# Patient Record
Sex: FEMALE | Race: BLACK OR AFRICAN AMERICAN | URBAN - METROPOLITAN AREA
[De-identification: names, ages, dates, MRNs, and addresses within clinical notes are randomized per-mention and may not be internally consistent; named-entity substitution may affect disease eponyms.]

---

## 2019-11-13 ENCOUNTER — HOSPITAL ENCOUNTER (OUTPATIENT)
Dept: PHYSICAL THERAPY | Age: 69
Discharge: HOME | End: 2019-11-13
Payer: MEDICARE

## 2019-11-13 PROCEDURE — 97162 PT EVAL MOD COMPLEX 30 MIN: CPT

## 2019-11-13 NOTE — PROGRESS NOTES
PHYSICAL THERAPY - DAILY TREATMENT NOTE    Patient Name: Jordi Hunt        Date: 2019  : 1950   Yes Patient  Verified  Visit #:     Insurance: Payor: Kim Kendall / Plan: [facility] / Product Type: Managed Care Medicare /      In time: 2:22 Out time: 2:53   Total Treatment Time: 31     Medicare/[facility] Time Tracking (below)   Total Timed Codes (min):  00 1:1 Treatment Time:  00     TREATMENT AREA =  Low back pain [M54.5]  Neck pain [M54.2]    SUBJECTIVE  Pain Level (on 0 to 10 scale):  7-8  / 10   Medication Changes/New allergies or changes in medical history, any new surgeries or procedures?    no  If yes, update Summary List   Subjective Functional Status/Changes:  []  No changes reported     See POC         OBJECTIVE  Modalities Rationale: Therex/HEP held secondary to late start of eval (prolonged time required for patient to complete paper work) and decreased tolerance for participation in evaluation due to high pain level    Billed With/As:   [x] TE   [] TA   [] Neuro   [] Self Care Patient Education: [x] Review HEP    [x] Progressed/Changed HEP based on:   [x] positioning   [x] body mechanics   [] transfers   [] heat/ice application    [] other:      Other Objective/Functional Measures:    See POC     Post Treatment Pain Level (on 0 to 10) scale:   8   10     ASSESSMENT  Assessment/Changes in Function:     See POC     []  See Progress Note/Recertification   Patient will continue to benefit from skilled PT services to modify and progress therapeutic interventions, address functional mobility deficits, address ROM deficits, address strength deficits, analyze and address soft tissue restrictions, analyze and cue movement patterns, analyze and modify body mechanics/ergonomics, assess and modify postural abnormalities and instruct in home and community integration to attain remaining goals.    Progress toward goals / Updated goals:    See newly established goals in [address] [facility]404 [address]  [x]  Upgrade activities as tolerated yes Continue plan of care   []  Discharge due to :    []  Other:      Therapist: Michelle Key    Date: 11/13/2019 Time: 12:26 PM     Future Appointments   Date Time Provider Denton Osorio   11/13/2019  2:00 PM Marisela Herrera
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
## 2019-11-18 ENCOUNTER — HOSPITAL ENCOUNTER (OUTPATIENT)
Dept: PHYSICAL THERAPY | Age: 69
Discharge: HOME | End: 2019-11-18
Payer: MEDICARE

## 2019-11-18 PROCEDURE — 97110 THERAPEUTIC EXERCISES: CPT

## 2019-11-18 PROCEDURE — 97140 MANUAL THERAPY 1/> REGIONS: CPT

## 2019-11-18 NOTE — PROGRESS NOTES
PHYSICAL THERAPY - DAILY TREATMENT NOTE    Patient Name: Chino Ulrich        Date: 2019  : 1950   yes Patient  Verified  Visit #:     Insurance: Payor: Sophy Hearing / Plan: [address] [place] [address] HMO / Product Type: Managed Care Medicare /      In time: 330 Out time: 410   Total Treatment Time: 40     Medicare/[employer] Time Tracking (below)   Total Timed Codes (min):  25 1:1 Treatment Time:  25     TREATMENT AREA =  Low back pain [M54.5]  Neck pain [M54.2]    SUBJECTIVE  Pain Level (on 0 to 10 scale):  7  / 10   Medication Changes/New allergies or changes in medical history, any new surgeries or procedures?    no  If yes, update Summary List   Subjective Functional Status/Changes:  []  No changes reported     Pt states she took a muscle relaxer before coming in and is having a really hard time staying awake, doing any kind of exercise. OBJECTIVE  Modalities Rationale:     decrease pain and increase tissue extensibility to improve patient's ability to perform pain free ADLs.     min [] Estim, type/location:                                      []  att     []  unatt     []  w/US     []  w/ice    []  w/heat    min []  Mechanical Traction: type/lbs                   []  pro   []  sup   []  int   []  cont    []  before manual    []  after manual    min []  Ultrasound, settings/location:      min []  Iontophoresis w/ dexamethasone, location:                                               []  take home patch       []  in clinic   15 min []  Ice     [x]  Heat    location/position: Supine, cervical.     min []  Vasopneumatic Device, press/temp:     min []  Other:    [x] Skin assessment post-treatment (if applicable):    [x]  intact    []  redness- no adverse reaction     []redness  adverse reaction:        10 min Therapeutic Exercise:  [x]  See flow sheet   Rationale:      increase ROM, increase strength and improve coordination to improve the patients ability to perform pain free ADLs. 15 Min Manual Therapy: STM/DTM (B) c/s paraspinals, UT/LS.  SOR. Rationale:      decrease pain, increase ROM, increase tissue extensibility and decrease trigger points to improve patient's ability to perform pain free ADLs. Billed With/As:   [x] TE   [] TA   [] Neuro   [] Self Care Patient Education: [x] Review HEP    [] Progressed/Changed HEP based on:   [] positioning   [] body mechanics   [] transfers   [] heat/ice application    [] other:      Other Objective/Functional Measures: Therex per flow sheet. Updated HEP exercises, instructed pt to perform 2x daily. Post Treatment Pain Level (on 0 to 10) scale:   7  / 10     ASSESSMENT  Assessment/Changes in Function:     Unable to initiate multiple exercises due to pt having poor motor control following the use of a muscle relaxer before session. []  See Progress Note/Recertification   Patient will continue to benefit from skilled PT services to modify and progress therapeutic interventions, address functional mobility deficits, address ROM deficits, address strength deficits, analyze and address soft tissue restrictions, analyze and cue movement patterns, analyze and modify body mechanics/ergonomics and assess and modify postural abnormalities to attain remaining goals. Progress toward goals / Updated goals:    Initiated follow-ups.        PLAN  [x]  Upgrade activities as tolerated yes Continue plan of care   []  Discharge due to :    []  Other:      Therapist: Chon Burns PTA    Date: 11/18/2019 Time: 4:06 PM     Future Appointments   Date Time Provider Denton Osorio   11/20/2019  3:30 PM Rani Wu [facility]   11/25/2019  3:00 PM Rani Wu [facility]   11/27/2019  3:30 PM Jannie Suero [facility]   12/2/2019  3:00 PM Manjit Lockhart PTA [facility]   12/4/2019  3:00 PM Manjit Lockhart PTA [facility]   12/9/2019  3:00 PM Rani Wu [facility]   12/11/2019  3:00 PM Manjit Lockhart PTA [facility] 12/16/2019  3:00 PM Yue Barrera [facility]   12/18/2019  3:00 PM Yue Barrera [facility]   12/23/2019  3:00 PM Carolann Hart [facility]

## 2019-11-20 ENCOUNTER — HOSPITAL ENCOUNTER (OUTPATIENT)
Dept: PHYSICAL THERAPY | Age: 69
Discharge: HOME | End: 2019-11-20
Payer: MEDICARE

## 2019-11-20 PROCEDURE — 97110 THERAPEUTIC EXERCISES: CPT

## 2019-11-20 PROCEDURE — 97140 MANUAL THERAPY 1/> REGIONS: CPT

## 2019-11-20 NOTE — PROGRESS NOTES
PHYSICAL THERAPY - DAILY TREATMENT NOTE    Patient Name: Gregoria Haider        Date: 2019  : 1950   yes Patient  Verified  Visit #:   3   of   12  Insurance: Payor: Yaron Siddiqi / Plan: [address] [place] [address] HMO / Product Type: Managed Care Medicare /      In time: 335 Out time: 440   Total Treatment Time: 65     Medicare/[employer] Time Tracking (below)   Total Timed Codes (min):  55 1:1 Treatment Time:  55     TREATMENT AREA =  Low back pain [M54.5]  Neck pain [M54.2]    SUBJECTIVE  Pain Level (on 0 to 10 scale):  7  / 10   Medication Changes/New allergies or changes in medical history, any new surgeries or procedures?    no  If yes, update Summary List   Subjective Functional Status/Changes:  []  No changes reported     I need to take my Tylenol and Flexeril. OBJECTIVE  Modalities Rationale:     decrease pain and increase tissue extensibility to improve patient's ability to perform pain free ADLs. min [] Estim, type/location:                                      []  att     []  unatt     []  w/US     []  w/ice    []  w/heat    min []  Mechanical Traction: type/lbs                   []  pro   []  sup   []  int   []  cont    []  before manual    []  after manual    min []  Ultrasound, settings/location:      min []  Iontophoresis w/ dexamethasone, location:                                               []  take home patch       []  in clinic   10 min []  Ice     [x]  Heat    location/position: Supine, cervical and lumbar. min []  Vasopneumatic Device, press/temp:     min []  Other:    [x] Skin assessment post-treatment (if applicable):    [x]  intact    []  redness- no adverse reaction     []redness  adverse reaction:        40 min Therapeutic Exercise:  [x]  See flow sheet   Rationale:      increase ROM, increase strength and improve coordination to improve the patients ability to perform pain free ADLs. 15 Min Manual Therapy: STM/DTM (B) c/s paraspinals, UT/LS.   SOR Rationale:      decrease pain, increase ROM, increase tissue extensibility and decrease trigger points to improve patient's ability to perform pain free ADLs. Billed With/As:   [x] TE   [] TA   [] Neuro   [] Self Care Patient Education: [x] Review HEP    [] Progressed/Changed HEP based on:   [] positioning   [] body mechanics   [] transfers   [] heat/ice application    [] other:      Other Objective/Functional Measures: Added multiple exercises to improve core, upper trunk, and cervical strength/mobility for ADLs. See flow sheet. Post Treatment Pain Level (on 0 to 10) scale:   7  / 10     ASSESSMENT  Assessment/Changes in Function:     VC's for abdominal draw. Pt hesitant to perform therex, but able to complete with encouragement. []  See Progress Note/Recertification   Patient will continue to benefit from skilled PT services to modify and progress therapeutic interventions, address functional mobility deficits, address ROM deficits, address strength deficits, analyze and address soft tissue restrictions, analyze and cue movement patterns, analyze and modify body mechanics/ergonomics and assess and modify postural abnormalities to attain remaining goals. Progress toward goals / Updated goals:    Good progress toward LTG's 1, 2, and 4 with good pt participation during therex.       PLAN  [x]  Upgrade activities as tolerated yes Continue plan of care   []  Discharge due to :    []  Other:      Therapist: Tahira Kenny PTA    Date: 11/20/2019 Time: 3:39 PM     Future Appointments   Date Time Provider Denton Osorio   11/25/2019  3:00 PM [facility]   11/27/2019  3:30 PM Dexter Suero [facility]   12/2/2019  3:00 PM Ewa Reece PTA [facility]   12/4/2019  3:00 PM Ewa Reece PTA [facility]   12/9/2019  3:00 PM Ewa Reece PTA [facility]   12/11/2019  3:00 PM [facility]   12/16/2019  3:00 PM [facility]   12/18/2019  3:00 PM Dawit Baltazar [facility]   12/23/2019  3:00 PM Brian Rankin [facility]

## 2019-11-25 ENCOUNTER — HOSPITAL ENCOUNTER (OUTPATIENT)
Dept: PHYSICAL THERAPY | Age: 69
Discharge: HOME | End: 2019-11-25
Payer: MEDICARE

## 2019-11-25 PROCEDURE — 97110 THERAPEUTIC EXERCISES: CPT

## 2019-11-25 PROCEDURE — 97140 MANUAL THERAPY 1/> REGIONS: CPT

## 2019-11-25 NOTE — PROGRESS NOTES
PHYSICAL THERAPY - DAILY TREATMENT NOTE    Patient Name: Erasmo Leo        Date: 2019  : 1950   yes Patient  Verified  Visit #:     Insurance: Payor: Alisa Konelly / Plan: [address] [place] [address] HMO / Product Type: Managed Care Medicare /      In time: 305 Out time: 410   Total Treatment Time: 65     Medicare/[employer] Time Tracking (below)   Total Timed Codes (min):  55 1:1 Treatment Time:  40     TREATMENT AREA =  Low back pain [M54.5]  Neck pain [M54.2]    SUBJECTIVE  Pain Level (on 0 to 10 scale):  7  / 10   Medication Changes/New allergies or changes in medical history, any new surgeries or procedures?    no  If yes, update Summary List   Subjective Functional Status/Changes:  []  No changes reported     No new complaints. OBJECTIVE  Modalities Rationale:     decrease pain and increase tissue extensibility to improve patient's ability to perform pain free ADLs. min [] Estim, type/location:                                      []  att     []  unatt     []  w/US     []  w/ice    []  w/heat    min []  Mechanical Traction: type/lbs                   []  pro   []  sup   []  int   []  cont    []  before manual    []  after manual    min []  Ultrasound, settings/location:      min []  Iontophoresis w/ dexamethasone, location:                                               []  take home patch       []  in clinic   10 min []  Ice     [x]  Heat    location/position: Supine, cervical.     min []  Vasopneumatic Device, press/temp:     min []  Other:    [x] Skin assessment post-treatment (if applicable):    [x]  intact    []  redness- no adverse reaction     []redness  adverse reaction:        40 (25) min Therapeutic Exercise:  [x]  See flow sheet   Rationale:      increase ROM, increase strength and improve coordination to improve the patients ability to perform pain free ADLs. 15 Min Manual Therapy: STM/DTM (R) c/s paraspinals.     Rationale:      decrease pain, increase ROM, increase tissue extensibility and decrease trigger points to improve patient's ability to perform pain free ADLs. Billed With/As:   [x] TE   [] TA   [] Neuro   [] Self Care Patient Education: [x] Review HEP    [] Progressed/Changed HEP based on:   [] positioning   [] body mechanics   [] transfers   [] heat/ice application    [] other:      Other Objective/Functional Measures: Therex per flow sheet. Post Treatment Pain Level (on 0 to 10) scale:   7  / 10     ASSESSMENT  Assessment/Changes in Function:     No exacerbation of symptoms with today's session. []  See Progress Note/Recertification   Patient will continue to benefit from skilled PT services to modify and progress therapeutic interventions, address functional mobility deficits, address ROM deficits, address strength deficits, analyze and address soft tissue restrictions, analyze and cue movement patterns, analyze and modify body mechanics/ergonomics and assess and modify postural abnormalities to attain remaining goals. Progress toward goals / Updated goals:    Slow progress toward pain goals.       PLAN  [x]  Upgrade activities as tolerated yes Continue plan of care   []  Discharge due to :    []  Other:      Therapist: Cohn Burns PTA    Date: 11/25/2019 Time: 3:12 PM     Future Appointments   Date Time Provider Denton Osorio   11/27/2019  3:30 PM Jannie Suero [facility]   12/2/2019  3:00 PM Manjit Lockhart, PTA [facility]   12/4/2019  3:00 PM Geralene Balder, PTA [facility]   12/9/2019  3:00 PM Gerkyra Balvincent, PTA [facility]   12/11/2019  3:00 PM Geralene Balvincent, PTA [facility]   12/16/2019  3:00 PM Rani Wu [facility]   12/18/2019  3:00 PM Rani Wu [facility]   12/23/2019  3:00 PM Mirta Cueva [facility]

## 2019-11-27 ENCOUNTER — HOSPITAL ENCOUNTER (OUTPATIENT)
Dept: PHYSICAL THERAPY | Age: 69
Discharge: HOME | End: 2019-11-27
Payer: MEDICARE

## 2019-11-27 PROCEDURE — 97140 MANUAL THERAPY 1/> REGIONS: CPT

## 2019-11-27 PROCEDURE — 97110 THERAPEUTIC EXERCISES: CPT

## 2019-11-27 NOTE — PROGRESS NOTES
PHYSICAL THERAPY - DAILY TREATMENT NOTE    Patient Name: Andrea Martel        Date: 2019  : 1950   yes Patient  Verified  Visit #:     Insurance: Payor: Chelsey Burleson / Plan: [address] [place] [address] HMO / Product Type: Managed Care Medicare /      In time: 3:37 PM Out time: 4:40 PM   Total Treatment Time: 63     Medicare/BCBS Time Tracking (below)   Total Timed Codes (min):  53 1:1 Treatment Time:  40     TREATMENT AREA =  Low back pain [M54.5]  Neck pain [M54.2]    SUBJECTIVE  Pain Level (on 0 to 10 scale):  6-7 / 10   Medication Changes/New allergies or changes in medical history, any new surgeries or procedures?    no  If yes, update Summary List   Subjective Functional Status/Changes:  []  No changes reported     Patient reports having more pain in her low back than neck. Neck and shoulders have been feeling better since beginning PT       OBJECTIVE  Modalities Rationale:     decrease pain and increase tissue extensibility to improve patient's ability to perform pain free ADLs.     min [] Estim, type/location:                                      []  att     []  unatt     []  w/US     []  w/ice    []  w/heat    min []  Mechanical Traction: type/lbs                   []  pro   []  sup   []  int   []  cont    []  before manual    []  after manual    min []  Ultrasound, settings/location:      min []  Iontophoresis w/ dexamethasone, location:                                               []  take home patch       []  in clinic   10 min []  Ice     [x]  Heat    location/position: To L/S in supine with wedge post-session    min []  Vasopneumatic Device, press/temp:     min []  Other:    [x] Skin assessment post-treatment (if applicable):    [x]  intact    []  redness- no adverse reaction     []redness  adverse reaction:        28/41 min Therapeutic Exercise:  [x]  See flow sheet   Rationale:      increase ROM, increase strength and improve coordination to improve the patients ability to perform pain free ADLs. 12 Min Manual Therapy: STM/DTM thoracolumbar junction and R>L L/S paraspinals in prone with 1 pillow under hips    Rationale:      decrease pain, increase ROM, increase tissue extensibility and decrease trigger points to improve patient's ability to perform pain free ADLs. Billed With/As:   [x] TE   [] TA   [] Neuro   [] Self Care Patient Education: [x] Review HEP    [] Progressed/Changed HEP based on:   [] positioning   [] body mechanics   [] transfers   [] heat/ice application    [] other:      Other Objective/Functional Measures:    1:1 TE = 28'    Difficulty with mat/table transfers. Prone<>sidelying<>sit, req'd CGA. Ease with supine <>sidelying <> sit   Added SB rollout flexion and progressed to bridges with glute set to improve spinal stability. Post Treatment Pain Level (on 0 to 10) scale:   0  / 10     ASSESSMENT  Assessment/Changes in Function:     Challenged with all therex likely due to weakness and poor endurance, however patient able to perform all therex with good form following vc's and encouragement. []  See Progress Note/Recertification   Patient will continue to benefit from skilled PT services to modify and progress therapeutic interventions, address functional mobility deficits, address ROM deficits, address strength deficits, analyze and address soft tissue restrictions, analyze and cue movement patterns, analyze and modify body mechanics/ergonomics and assess and modify postural abnormalities to attain remaining goals.    Progress toward goals / Updated goals:    Progressing towards LTG #2     PLAN  [x]  Upgrade activities as tolerated yes Continue plan of care   []  Discharge due to :    []  Other:      Therapist: Ebony MOLINA    Date: 11/27/2019 Time: 4:57 PM     Future Appointments   Date Time Provider Denton Osorio   11/27/2019  3:30 PM Martha Suero [facility]   12/2/2019  3:00 PM Michelle Short PTA [facility]   12/4/2019 3:00 PM Obie Saver [facility]   12/9/2019  3:00 PM Obie Saver [facility]   12/11/2019  3:00 PM Pierce Saver [facility]   12/16/2019  3:00 PM Pierce Saver [facility]   12/18/2019  3:00 PM Pierce Saver [facility]   12/23/2019  3:00 PM Abilio Clements [facility]

## 2019-12-02 ENCOUNTER — HOSPITAL ENCOUNTER (OUTPATIENT)
Dept: PHYSICAL THERAPY | Age: 69
Discharge: HOME | End: 2019-12-02
Payer: MEDICARE

## 2019-12-02 PROCEDURE — 97110 THERAPEUTIC EXERCISES: CPT

## 2019-12-02 PROCEDURE — 97140 MANUAL THERAPY 1/> REGIONS: CPT

## 2019-12-02 NOTE — PROGRESS NOTES
PHYSICAL THERAPY - DAILY TREATMENT NOTE    Patient Name: Rosemary Cloud        Date: 2019  : 1950   yes Patient  Verified  Visit #:     Insurance: Payor: Robert Vick / Plan: [address] [place] [address] HMO / Product Type: Managed Care Medicare /      In time: 330 Out time: 450   Total Treatment Time: 80     Medicare/BCBS Time Tracking (below)   Total Timed Codes (min):  70 1:1 Treatment Time:  40     TREATMENT AREA =  Low back pain [M54.5]  Neck pain [M54.2]    SUBJECTIVE  Pain Level (on 0 to 10 scale):  6  / 10   Medication Changes/New allergies or changes in medical history, any new surgeries or procedures?    no  If yes, update Summary List   Subjective Functional Status/Changes:  []  No changes reported     Pt reports pain in the (R) neck/shoulder, low back. Says her knee is bothering her today as well from using the steps. OBJECTIVE  Modalities Rationale:     decrease pain and increase tissue extensibility to improve patient's ability to perform pain free ADLs. min [] Estim, type/location:                                      []  att     []  unatt     []  w/US     []  w/ice    []  w/heat    min []  Mechanical Traction: type/lbs                   []  pro   []  sup   []  int   []  cont    []  before manual    []  after manual    min []  Ultrasound, settings/location:      min []  Iontophoresis w/ dexamethasone, location:                                               []  take home patch       []  in clinic   10 min []  Ice     [x]  Heat    location/position: Supine, lumbar. min []  Vasopneumatic Device, press/temp:     min []  Other:    [x] Skin assessment post-treatment (if applicable):    [x]  intact    []  redness- no adverse reaction     []redness  adverse reaction:        45 (15) min Therapeutic Exercise:  [x]  See flow sheet   Rationale:      increase ROM, increase strength and improve coordination to improve the patients ability to perform pain free ADLs. 25 min Manual Therapy: STM/DTM (B) c/s paraspinals, SOR. Rationale:      decrease pain, increase ROM, increase tissue extensibility and decrease trigger points to improve patient's ability to perform pain free ADLs. Billed With/As:   [x] TE   [] TA   [] Neuro   [] Self Care Patient Education: [x] Review HEP    [] Progressed/Changed HEP based on:   [] positioning   [] body mechanics   [] transfers   [] heat/ice application    [] other:      Other Objective/Functional Measures: Added Deadbugs, rows/ext w/ t-band to improve trunk/core strength for ADLs. Post Treatment Pain Level (on 0 to 10) scale:   3-4  / 10     ASSESSMENT  Assessment/Changes in Function:     No exacerbation of symptoms with today's session. []  See Progress Note/Recertification   Patient will continue to benefit from skilled PT services to modify and progress therapeutic interventions, address functional mobility deficits, address ROM deficits, address strength deficits, analyze and address soft tissue restrictions, analyze and cue movement patterns, analyze and modify body mechanics/ergonomics and assess and modify postural abnormalities to attain remaining goals. Progress toward goals / Updated goals:    No change in progress toward LTG's with today's session.       PLAN  [x]  Upgrade activities as tolerated yes Continue plan of care   []  Discharge due to :    []  Other:      Therapist: Alfa Parr PTA    Date: 12/2/2019 Time: 4:02 PM     Future Appointments   Date Time Provider Denton Osorio   12/4/2019  3:00 PM [facility]   12/9/2019  3:00 PM [facility]   12/11/2019  3:00 PM Nahomy Rasmussen PTA [facility]   12/16/2019  3:00 PM [facility]   12/18/2019  3:00 PM [facility]   12/23/2019  3:00 PM Meme Kong [facility]

## 2019-12-04 ENCOUNTER — HOSPITAL ENCOUNTER (OUTPATIENT)
Dept: PHYSICAL THERAPY | Age: 69
Discharge: HOME | End: 2019-12-04
Payer: MEDICARE

## 2019-12-04 PROCEDURE — 97140 MANUAL THERAPY 1/> REGIONS: CPT

## 2019-12-04 PROCEDURE — 97110 THERAPEUTIC EXERCISES: CPT

## 2019-12-04 NOTE — PROGRESS NOTES
PHYSICAL THERAPY - DAILY TREATMENT NOTE    Patient Name: Gregoria Haider        Date: 2019  : 1950   yes Patient  Verified  Visit #:     Insurance: Payor: Yaron Siddiqi / Plan: [address] [place] [address] HMO / Product Type: Managed Care Medicare /      In time: 3 Out time: 4   Total Treatment Time: 60     Medicare/BCBS Time Tracking (below)   Total Timed Codes (min):  50 1:1 Treatment Time:  50     TREATMENT AREA =  Low back pain [M54.5]  Neck pain [M54.2]    SUBJECTIVE  Pain Level (on 0 to 10 scale):  6  / 10   Medication Changes/New allergies or changes in medical history, any new surgeries or procedures?    no  If yes, update Summary List   Subjective Functional Status/Changes:  []  No changes reported     Pt reporting pain in the neck today. States it's really more uncomfortable than it is painful. OBJECTIVE  Modalities Rationale:     decrease pain and increase tissue extensibility to improve patient's ability to perform pain free ADLs. min [] Estim, type/location:                                      []  att     []  unatt     []  w/US     []  w/ice    []  w/heat    min []  Mechanical Traction: type/lbs                   []  pro   []  sup   []  int   []  cont    []  before manual    []  after manual    min []  Ultrasound, settings/location:      min []  Iontophoresis w/ dexamethasone, location:                                               []  take home patch       []  in clinic   10 min []  Ice     [x]  Heat    location/position: Supine, lumbar. min []  Vasopneumatic Device, press/temp:     min []  Other:    [x] Skin assessment post-treatment (if applicable):    [x]  intact    []  redness- no adverse reaction     []redness  adverse reaction:        35 min Therapeutic Exercise:  [x]  See flow sheet   Rationale:      increase ROM, increase strength and improve coordination to improve the patients ability to perform pain free ADLs.       15 Min Manual Therapy: STM/DTM (B) c/s paraspinals, SOR. Rationale:      decrease pain, increase ROM, increase tissue extensibility and decrease trigger points to improve patient's ability to perform pain free ADLs. Billed With/As:   [x] TE   [] TA   [] Neuro   [] Self Care Patient Education: [x] Review HEP    [] Progressed/Changed HEP based on:   [] positioning   [] body mechanics   [] transfers   [] heat/ice application    [] other:      Other Objective/Functional Measures:    Able to perform full ROM bridge with some difficulty. Post Treatment Pain Level (on 0 to 10) scale:   4  / 10     ASSESSMENT  Assessment/Changes in Function:     No exacerbation of symptoms with today's session. []  See Progress Note/Recertification   Patient will continue to benefit from skilled PT services to modify and progress therapeutic interventions, address functional mobility deficits, address ROM deficits, address strength deficits, analyze and address soft tissue restrictions, analyze and cue movement patterns, analyze and modify body mechanics/ergonomics and assess and modify postural abnormalities to attain remaining goals. Progress toward goals / Updated goals:    LTG #4.       PLAN  [x]  Upgrade activities as tolerated yes Continue plan of care   []  Discharge due to :    []  Other:      Therapist: Seda Mayfield PTA    Date: 12/4/2019 Time: 3:07 PM     Future Appointments   Date Time Provider Denton Osorio   12/9/2019  3:00 PM [facility]   12/11/2019  3:00 PM [facility]   12/16/2019  3:00 PM [facility]   12/18/2019  3:00 PM [facility]   12/23/2019  3:00 PM Danny Monterroso [facility]

## 2019-12-09 ENCOUNTER — HOSPITAL ENCOUNTER (OUTPATIENT)
Dept: PHYSICAL THERAPY | Age: 69
Discharge: HOME | End: 2019-12-09
Payer: MEDICARE

## 2019-12-09 PROCEDURE — 97140 MANUAL THERAPY 1/> REGIONS: CPT

## 2019-12-09 PROCEDURE — 97110 THERAPEUTIC EXERCISES: CPT

## 2019-12-09 NOTE — PROGRESS NOTES
PHYSICAL THERAPY - DAILY TREATMENT NOTE    Patient Name: Eboni Browning        Date: 2019  : 1950   yes Patient  Verified  Visit #:     Insurance: Payor: Mandie Sharma / Plan: [address] [place] [address] HMO / Product Type: Managed Care Medicare /      In time: 315 Out time: 410   Total Treatment Time: 55     Medicare/BCBS Time Tracking (below)   Total Timed Codes (min):  45 1:1 Treatment Time:  45     TREATMENT AREA =  Low back pain [M54.5]  Neck pain [M54.2]    SUBJECTIVE  Pain Level (on 0 to 10 scale):  5  / 10   Medication Changes/New allergies or changes in medical history, any new surgeries or procedures?    no  If yes, update Summary List   Subjective Functional Status/Changes:  []  No changes reported     Pt reporting a lot of stiffness in the mid to low back today. OBJECTIVE  Modalities Rationale:     decrease pain and increase tissue extensibility to improve patient's ability to perform pain free ADLs. min [] Estim, type/location:                                      []  att     []  unatt     []  w/US     []  w/ice    []  w/heat    min []  Mechanical Traction: type/lbs                   []  pro   []  sup   []  int   []  cont    []  before manual    []  after manual    min []  Ultrasound, settings/location:      min []  Iontophoresis w/ dexamethasone, location:                                               []  take home patch       []  in clinic   10 min []  Ice     [x]  Heat    location/position: Supine, lumbar. min []  Vasopneumatic Device, press/temp:     min []  Other:    [x] Skin assessment post-treatment (if applicable):    [x]  intact    []  redness- no adverse reaction     []redness  adverse reaction:        35 min Therapeutic Exercise:  [x]  See flow sheet   Rationale:      increase ROM, increase strength and improve coordination to improve the patients ability to perform pain free ADLs. 10 min Manual Therapy: STM/DTM (B) lumbar paraspinals. Rationale:      decrease pain, increase ROM, increase tissue extensibility and decrease trigger points to improve patient's ability to perform pain free ADLs. Billed With/As:   [x] TE   [] TA   [] Neuro   [] Self Care Patient Education: [x] Review HEP    [] Progressed/Changed HEP based on:   [] positioning   [] body mechanics   [] transfers   [] heat/ice application    [] other:      Other Objective/Functional Measures: Therex per flow sheet. Post Treatment Pain Level (on 0 to 10) scale:   4  / 10     ASSESSMENT  Assessment/Changes in Function:     No exacerbation of symptoms with today's session. []  See Progress Note/Recertification   Patient will continue to benefit from skilled PT services to modify and progress therapeutic interventions, address functional mobility deficits, address ROM deficits, address strength deficits, analyze and address soft tissue restrictions, analyze and cue movement patterns, analyze and modify body mechanics/ergonomics and assess and modify postural abnormalities to attain remaining goals. Progress toward goals / Updated goals:    No change in progress toward LTG's with today's session.       PLAN  [x]  Upgrade activities as tolerated yes Continue plan of care   []  Discharge due to :    []  Other:      Therapist: Checo Rocha PTA    Date: 12/9/2019 Time: 3:22 PM     Future Appointments   Date Time Provider Denton Osorio   12/11/2019  3:00 PM [facility]   12/16/2019  3:00 PM [facility]   12/18/2019  3:00 PM [facility]   12/23/2019  3:00 PM Luciana Aburto [facility]

## 2019-12-11 ENCOUNTER — APPOINTMENT (OUTPATIENT)
Dept: PHYSICAL THERAPY | Age: 69
End: 2019-12-11
Payer: MEDICARE

## 2019-12-13 ENCOUNTER — HOSPITAL ENCOUNTER (OUTPATIENT)
Dept: PHYSICAL THERAPY | Age: 69
Discharge: HOME | End: 2019-12-13
Payer: MEDICARE

## 2019-12-13 PROCEDURE — 97110 THERAPEUTIC EXERCISES: CPT

## 2019-12-13 NOTE — PROGRESS NOTES
PHYSICAL THERAPY - DAILY TREATMENT NOTE    Patient Name: Saintclair Lane        Date: 2019  : 1950   yes Patient  Verified  Visit #:     Insurance: Payor: [place] / Plan: [address] [place] [address] HMO / Product Type: Managed Care Medicare /      In time: 9:49 Out time: 10:33   Total Treatment Time: 44     Medicare/BCBS Time Tracking (below)   Total Timed Codes (min):  34 1:1 Treatment Time:  10     TREATMENT AREA =  Low back pain [M54.5]  Neck pain [M54.2]    SUBJECTIVE  Pain Level (on 0 to 10 scale): 5 / 10   Medication Changes/New allergies or changes in medical history, any new surgeries or procedures?    no  If yes, update Summary List   Subjective Functional Status/Changes:  []  No changes reported     Patient reports she hasn't been able to take her Flexiril at night because of school, but continues to take Tylenol. \"I think it will be better when I'm done with school so I can just relax and not lug my books and bag around campus\"       OBJECTIVE  Modalities Rationale:     decrease pain and increase tissue extensibility to improve patient's ability to perform pain free ADLs.     min [] Estim, type/location:                                      []  att     []  unatt     []  w/US     []  w/ice    []  w/heat    min []  Mechanical Traction: type/lbs                   []  pro   []  sup   []  int   []  cont    []  before manual    []  after manual    min []  Ultrasound, settings/location:      min []  Iontophoresis w/ dexamethasone, location:                                               []  take home patch       []  in clinic   10 min []  Ice     [x]  Heat    location/position: To L/S in supine with wedge post-session    min []  Vasopneumatic Device, press/temp:     min []  Other:    [x] Skin assessment post-treatment (if applicable):    [x]  intact    []  redness- no adverse reaction     []redness  adverse reaction:        10/34 min Therapeutic Exercise:  [x]  See flow sheet Rationale:      increase ROM, increase strength and improve coordination to improve the patients ability to perform pain free ADLs. Billed With/As:   [x] TE   [] TA   [] Neuro   [] Self Care Patient Education: [x] Review HEP    [] Progressed/Changed HEP based on:   [] positioning   [] body mechanics   [] transfers   [] heat/ice application    [] other:      Other Objective/Functional Measures:    1:1 TE = 10'    Patient presented to PT session 19 minutes late. Agreeable to hold MT and focus on therex. Pain levels: min 2/10, avg 4-5/10, and max 7/10  Functional mobility tolerance: walking 4-5 minutes, standing < 1 hour   Bridge reduced 50%    FOTO: 63  GROC: +7    L/S AROM: flex to shins, extension reduced 50%, (B) rot reduced 50%, LSB to fib head, RSB to mid thigh. All directions painful upon initiation and end range, reduced with repetition     Post Treatment Pain Level (on 0 to 10) scale:   3-4  / 10     ASSESSMENT  Assessment/Changes in Function:     Patient progressing well with current PT interventions indicated by reduced pain levels and improved FOTO/GROC score. []  See Progress Note/Recertification   Patient will continue to benefit from skilled PT services to modify and progress therapeutic interventions, address functional mobility deficits, address ROM deficits, address strength deficits, analyze and address soft tissue restrictions, analyze and cue movement patterns, analyze and modify body mechanics/ergonomics and assess and modify postural abnormalities to attain remaining goals.    Progress toward goals / Updated goals:    Slow progress towards LTGs     PLAN  [x]  Upgrade activities as tolerated yes Continue plan of care   []  Discharge due to :    []  Other:      Therapist: Dima MOLINA    Date: 12/13/2019 Time: 11:24     Future Appointments   Date Time Provider Denton Osorio   12/16/2019  3:00 PM Talha HAERT [facility]   12/18/2019  3:00 PM Afsaneh Bazan PTA [facility]   12/23/2019  3:00 PM Zen Dorsey

## 2019-12-16 ENCOUNTER — APPOINTMENT (OUTPATIENT)
Dept: PHYSICAL THERAPY | Age: 69
End: 2019-12-16
Payer: MEDICARE

## 2019-12-18 ENCOUNTER — HOSPITAL ENCOUNTER (OUTPATIENT)
Dept: PHYSICAL THERAPY | Age: 69
Discharge: HOME | End: 2019-12-18
Payer: MEDICARE

## 2019-12-18 PROCEDURE — 97110 THERAPEUTIC EXERCISES: CPT

## 2019-12-18 PROCEDURE — 97140 MANUAL THERAPY 1/> REGIONS: CPT

## 2019-12-18 NOTE — PROGRESS NOTES
PHYSICAL THERAPY - DAILY TREATMENT NOTE    Patient Name: Tello Norris        Date: 2019  : 1950   yes Patient  Verified  Visit #:   10   of   12  Insurance: Payor: Denisse Engel / Plan: [address] [place] [address] HMO / Product Type: Managed Care Medicare /      In time: 3 Out time: 410   Total Treatment Time: 70     Medicare/BCBS Time Tracking (below)   Total Timed Codes (min):  60 1:1 Treatment Time:  60     TREATMENT AREA =  Low back pain [M54.5]  Neck pain [M54.2]    SUBJECTIVE  Pain Level (on 0 to 10 scale):  5  / 10   Medication Changes/New allergies or changes in medical history, any new surgeries or procedures?    no  If yes, update Summary List   Subjective Functional Status/Changes:  []  No changes reported     No new complaints. OBJECTIVE  Modalities Rationale:     decrease inflammation and decrease pain to improve patient's ability to perform pain free ADLs. min [] Estim, type/location:                                      []  att     []  unatt     []  w/US     []  w/ice    []  w/heat    min []  Mechanical Traction: type/lbs                   []  pro   []  sup   []  int   []  cont    []  before manual    []  after manual    min []  Ultrasound, settings/location:      min []  Iontophoresis w/ dexamethasone, location:                                               []  take home patch       []  in clinic   10 min []  Ice     [x]  Heat    location/position: Supine, cervical/lumbar. min []  Vasopneumatic Device, press/temp:     min []  Other:    [x] Skin assessment post-treatment (if applicable):    [x]  intact    []  redness- no adverse reaction     []redness  adverse reaction:        15 min Therapeutic Exercise:  [x]  See flow sheet   Rationale:      increase ROM, increase strength and improve coordination to improve the patients ability to perform pain free ADLs. 45 min Manual Therapy: STM/DTM (B) t/s paraspinals, c/s paraspinals.     Rationale:      decrease pain, increase ROM, increase tissue extensibility and decrease trigger points to improve patient's ability to perform pain free ADLs. Billed With/As:   [x] TE   [] TA   [] Neuro   [] Self Care Patient Education: [x] Review HEP    [] Progressed/Changed HEP based on:   [] positioning   [] body mechanics   [] transfers   [] heat/ice application    [] other:      Other Objective/Functional Measures: Therex per flow sheet. Post Treatment Pain Level (on 0 to 10) scale:   5  / 10     ASSESSMENT  Assessment/Changes in Function:   TTP (B) t/s paraspinals, UT/LS. []  See Progress Note/Recertification   Patient will continue to benefit from skilled PT services to modify and progress therapeutic interventions, address functional mobility deficits, address ROM deficits, address strength deficits, analyze and address soft tissue restrictions, analyze and cue movement patterns, analyze and modify body mechanics/ergonomics and assess and modify postural abnormalities to attain remaining goals. Progress toward goals / Updated goals:    Slow progress toward pain goals.        PLAN  [x]  Upgrade activities as tolerated yes Continue plan of care   []  Discharge due to :    []  Other:      Therapist: Frankie Carrel, PTA    Date: 12/18/2019 Time: 5:55 PM     Future Appointments   Date Time Provider Denton Osorio   12/23/2019  3:00 PM Sinai Tinoco

## 2019-12-23 ENCOUNTER — HOSPITAL ENCOUNTER (OUTPATIENT)
Dept: PHYSICAL THERAPY | Age: 69
Discharge: HOME | End: 2019-12-23
Payer: MEDICARE

## 2019-12-23 PROCEDURE — 97140 MANUAL THERAPY 1/> REGIONS: CPT

## 2019-12-23 PROCEDURE — 97110 THERAPEUTIC EXERCISES: CPT

## 2019-12-23 NOTE — PROGRESS NOTES
Final Anesthesia Post-op Assessment    Patient: Guerline Penn  Procedure(s) Performed: CATARACT EXTRACTION WITH INTRAOCULAR LENS IMPLANTATION/RIGHT EYE SAME DAY  Anesthesia type: Monitor Anesthesia Care    Vital Last Value   Temperature 36.3 °C (97.4 °F) (08/29/18 1129)   Pulse 73 (08/29/18 1134)   Respiratory Rate 20 (08/29/18 1134)   Non-Invasive   Blood Pressure 121/54 (08/29/18 1134)   Arterial  Blood Pressure     Pulse Oximetry 93 % (08/29/18 1134)     Last 24 I/O:   Intake/Output Summary (Last 24 hours) at 08/29/18 1200  Last data filed at 08/29/18 1130   Gross per 24 hour   Intake               50 ml   Output                0 ml   Net               50 ml       PATIENT LOCATION: PACU Phase 1  POST-OP VITAL SIGNS: stable  LEVEL OF CONSCIOUSNESS: participates in exam and answers questions appropriately  RESPIRATORY STATUS: spontaneous ventilation  CARDIOVASCULAR: blood pressure returned to baseline  HYDRATION: euvolemic    PAIN MANAGEMENT: well controlled  NAUSEA: None  AIRWAY PATENCY: patent  POST-OP ASSESSMENT: sufficiently recovered from acute administration of anesthesia effects and able to participate in evaluation       PHYSICAL THERAPY - DAILY TREATMENT NOTE    Patient Name: Tata Black        Date: 2019  : 1950   yes Patient  Verified  Visit #:     Insurance: Payor: Donnell Curling / Plan: [address] [place] [address] HMO / Product Type: Managed Care Medicare /      In time: 3:08 Out time: 4:20   Total Treatment Time: 72     Medicare/BCBS Time Tracking (below)   Total Timed Codes (min):  62 1:1 Treatment Time:  52     TREATMENT AREA =  Low back pain [M54.5]  Neck pain [M54.2]    SUBJECTIVE  Pain Level (on 0 to 10 scale): 1-2 / 10   Medication Changes/New allergies or changes in medical history, any new surgeries or procedures?    no  If yes, update Summary List   Subjective Functional Status/Changes:  []  No changes reported     Patient reports feeling good today because she hasn't been dragging around her backpack or walking around campus. Notices more LBP than neck pain. Only took 1 Tylenol today vs 2. OBJECTIVE  Modalities Rationale:     decrease pain and increase tissue extensibility to improve patient's ability to perform pain free ADLs.     min [] Estim, type/location:                                      []  att     []  unatt     []  w/US     []  w/ice    []  w/heat    min []  Mechanical Traction: type/lbs                   []  pro   []  sup   []  int   []  cont    []  before manual    []  after manual    min []  Ultrasound, settings/location:      min []  Iontophoresis w/ dexamethasone, location:                                               []  take home patch       []  in clinic   10 min []  Ice     [x]  Heat    location/position: To L/S in supine with wedge post-session    min []  Vasopneumatic Device, press/temp:     min []  Other:    [x] Skin assessment post-treatment (if applicable):    [x]  intact    []  redness- no adverse reaction     []redness  adverse reaction:        42/  52 min Therapeutic Exercise:  [x]  See flow sheet   Rationale:      increase ROM, increase strength and improve coordination to improve the patients ability to perform prolonged walking activities. 10 min Manual Therapy: STM/DTM to (B) L/S paraspinals and (B) T/S paraspinals in prone    Rationale:      decrease pain, increase ROM and increase tissue extensibility to improve patient's ability to perform prolonged standing activities. Billed With/As:   [x] TE   [] TA   [] Neuro   [] Self Care Patient Education: [x] Review HEP    [] Progressed/Changed HEP based on:   [] positioning   [] body mechanics   [] transfers   [] heat/ice application    [] other:      Other Objective/Functional Measures:    1:1 TE = 42' (2 TE billed)    Issued updated HEP to include L/S stretches. Difficulty with bed transfers. Requires cues throughout PT session to avoid Valsalva and proper form to prevent exacerbation of symptoms. Post Treatment Pain Level (on 0 to 10) scale:  3-4  / 10     ASSESSMENT  Assessment/Changes in Function:     Difficulty with all therex, however demo improved ease with increase repetition. Patient noted increase pain level following PT session, however noted more muscle ache vs sharp pain. Educated pt on DOMS and noting this is a normal response due to activity level. Pt verbalized understanding. Will continue to progress therex to address LBP and maintain gains made to c/s. []  See Progress Note/Recertification   Patient will continue to benefit from skilled PT services to modify and progress therapeutic interventions, address functional mobility deficits, address ROM deficits, address strength deficits, analyze and address soft tissue restrictions, analyze and cue movement patterns, analyze and modify body mechanics/ergonomics and assess and modify postural abnormalities to attain remaining goals. Progress toward goals / Updated goals:    · Goals for this certification period include and are to be achieved in   3-4  weeks:  1. Patient independent with HEP.  Progressing 12/23; established updated L/S HEP  2. Patient will increase L/S ROM  to WNLs to increase ability to perform household chores. 3. Increase FOTO to 70/100 indicating improved function and quality of life.        PLAN  [x]  Upgrade activities as tolerated yes Continue plan of care   []  Discharge due to :    []  Other:      Therapist: Phil MOLINA    Date: 12/23/2019 Time: 4:23     No future appointments.
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
## 2019-12-31 ENCOUNTER — HOSPITAL ENCOUNTER (OUTPATIENT)
Dept: PHYSICAL THERAPY | Age: 69
Discharge: HOME | End: 2019-12-31
Payer: MEDICARE

## 2019-12-31 PROCEDURE — 97110 THERAPEUTIC EXERCISES: CPT

## 2019-12-31 NOTE — PROGRESS NOTES
PHYSICAL THERAPY - DAILY TREATMENT NOTE    Patient Name: Yesenia Gann        Date: 2019  : 1950   yes Patient  Verified  Visit #:     Insurance: Payor: Adisdamian Foley / Plan: [address] [place] [address] HMO / Product Type: Managed Care Medicare /      In time: 11:43 Out time: 12:48   Total Treatment Time: 65     Medicare/BCBS Time Tracking (below)   Total Timed Codes (min):  55 1:1 Treatment Time:  55     TREATMENT AREA =  Low back pain [M54.5]  Neck pain [M54.2]    SUBJECTIVE  Pain Level (on 0 to 10 scale): 1 / 10   Medication Changes/New allergies or changes in medical history, any new surgeries or procedures?    no  If yes, update Summary List   Subjective Functional Status/Changes:  []  No changes reported     Patient reports doing her stretches at home and it's been helping with the pain. OBJECTIVE  Modalities Rationale:     decrease pain and increase tissue extensibility to improve patient's ability to perform pain free ADLs. min [] Estim, type/location:                                      []  att     []  unatt     []  w/US     []  w/ice    []  w/heat    min []  Mechanical Traction: type/lbs                   []  pro   []  sup   []  int   []  cont    []  before manual    []  after manual    min []  Ultrasound, settings/location:      min []  Iontophoresis w/ dexamethasone, location:                                               []  take home patch       []  in clinic   10 min []  Ice     [x]  Heat    location/position: To L/S in supine with wedge post-session    min []  Vasopneumatic Device, press/temp:     min []  Other:    [x] Skin assessment post-treatment (if applicable):    [x]  intact    []  redness- no adverse reaction     []redness  adverse reaction:        55 min Therapeutic Exercise:  [x]  See flow sheet   Rationale:      increase ROM, increase strength and improve coordination to improve the patients ability to perform prolonged walking activities. Billed With/As:   [x] TE   [] TA   [] Neuro   [] Self Care Patient Education: [x] Review HEP    [] Progressed/Changed HEP based on:   [] positioning   [] body mechanics   [] transfers   [] heat/ice application    [] other:      Other Objective/Functional Measures:    1:1 TE = 54'    Presented to PT session 13 minutes late to appt. Agreeable to hold manual therapy. Increased repetitions with TB H/L abd and bridges and added mini squats to table to improve glute stability/strength. Post Treatment Pain Level (on 0 to 10) scale:  3-4  / 10     ASSESSMENT  Assessment/Changes in Function:     Patient easily fatigued with therex, requiring cues throughout to avoid Valsalva maneuver. Continues to be challenged with bed transfers (supine<>sidelying<>sit), able to perform with correct form following cues. []  See Progress Note/Recertification   Patient will continue to benefit from skilled PT services to modify and progress therapeutic interventions, address functional mobility deficits, address ROM deficits, address strength deficits, analyze and address soft tissue restrictions, analyze and cue movement patterns, analyze and modify body mechanics/ergonomics and assess and modify postural abnormalities to attain remaining goals. Progress toward goals / Updated goals:    · Goals for this certification period include and are to be achieved in   3-4  weeks:  1. Patient independent with HEP. Progressing 12/31; reports compliance with HEP stretches  2. Patient will increase L/S ROM  to WNLs to increase ability to perform household chores.    3. Increase FOTO to 70/100 indicating improved function and quality of life.        PLAN  [x]  Upgrade activities as tolerated yes Continue plan of care   []  Discharge due to :    []  Other:      Therapist: Maria Luisa MOLINA    Date: 12/31/2019 Time: 12:48     Future Appointments   Date Time Provider Denton Osorio   12/31/2019 11:30 AM Kitty Suero

## 2020-01-06 ENCOUNTER — HOSPITAL ENCOUNTER (OUTPATIENT)
Dept: PHYSICAL THERAPY | Age: 70
Discharge: HOME | End: 2020-01-06
Payer: MEDICARE

## 2020-01-06 PROCEDURE — 97110 THERAPEUTIC EXERCISES: CPT

## 2020-01-06 NOTE — PROGRESS NOTES
PHYSICAL THERAPY - DAILY TREATMENT NOTE    Patient Name: Patti Acevedo        Date: 2020  : 1950   yes Patient  Verified  Visit #:   15   of   20  Insurance: Payor: Lola Al / Plan: [address] [place] [address] HMO / Product Type: Managed Care Medicare /      In time: 9:05 Out time: 9:52   Total Treatment Time: 47     Medicare/BCBS Time Tracking (below)   Total Timed Codes (min):  37 1:1 Treatment Time: 23     TREATMENT AREA =  Low back pain [M54.5]  Neck pain [M54.2]    SUBJECTIVE  Pain Level (on 0 to 10 scale):  1  / 10   Medication Changes/New allergies or changes in medical history, any new surgeries or procedures?    no  If yes, update Summary List   Subjective Functional Status/Changes:  []  No changes reported     No new complaints to report            OBJECTIVE  Modalities Rationale:     decrease pain and increase tissue extensibility to improve patient's ability to perform pain free ADLs. min [] Estim, type/location:                                      []  att     []  unatt     []  w/US     []  w/ice    []  w/heat    min []  Mechanical Traction: type/lbs                   []  pro   []  sup   []  int   []  cont    []  before manual    []  after manual    min []  Ultrasound, settings/location:      min []  Iontophoresis w/ dexamethasone, location:                                               []  take home patch       []  in clinic   10 min []  Ice     [x]  Heat    location/position: To L/S in supine with wedge post-session    min []  Vasopneumatic Device, press/temp:     min []  Other:    [x] Skin assessment post-treatment (if applicable):    [x]  intact    []  redness- no adverse reaction     []redness  adverse reaction:        37 (bill 23) min Therapeutic Exercise:  [x]  See flow sheet   Rationale:      increase ROM, increase strength and improve coordination to improve the patients ability to perform prolonged walking activities.        Billed With/As:   [x] TE   [] TA   [] Neuro   [] Self Care Patient Education: [x] Review HEP    [] Progressed/Changed HEP based on:   [] positioning   [] body mechanics   [] transfers   [] heat/ice application    [] other:      Other Objective/Functional Measures:    1:1 TE = 23    Discussed decreasing to 1x per week in order to DC in 2 weeks to home mgt of sx   Post Treatment Pain Level (on 0 to 10) scale:  2  / 10     ASSESSMENT  Assessment/Changes in Function:     Cuing with therex for controlled motions. Reports increased C/S tightness with Tband strengthening - cuing to decrease UT involvement for improved tolerance. []  See Progress Note/Recertification   Patient will continue to benefit from skilled PT services to modify and progress therapeutic interventions, address functional mobility deficits, address ROM deficits, address strength deficits, analyze and address soft tissue restrictions, analyze and cue movement patterns, analyze and modify body mechanics/ergonomics and assess and modify postural abnormalities to attain remaining goals. Progress toward goals / Updated goals:    · Goals for this certification period include and are to be achieved in   3-4  weeks:  1. Patient independent with HEP. Progressing 12/31; reports compliance with HEP stretches  2. Patient will increase L/S ROM  to WNLs to increase ability to perform household chores.    3. Increase FOTO to 70/100 indicating improved function and quality of life.        PLAN  [x]  Upgrade activities as tolerated yes Continue plan of care   []  Discharge due to :    []  Other:      Therapist: Chris Gordon    Date: 1/6/2020 Time: 12:48     Future Appointments   Date Time Provider Denton Osorio   1/6/2020  9:00 AM Radha [facility]
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
## 2020-01-15 ENCOUNTER — HOSPITAL ENCOUNTER (OUTPATIENT)
Dept: PHYSICAL THERAPY | Age: 70
Discharge: HOME | End: 2020-01-15
Payer: MEDICARE

## 2020-01-15 PROCEDURE — 97140 MANUAL THERAPY 1/> REGIONS: CPT

## 2020-01-15 PROCEDURE — 97110 THERAPEUTIC EXERCISES: CPT

## 2020-01-20 ENCOUNTER — APPOINTMENT (OUTPATIENT)
Dept: PHYSICAL THERAPY | Age: 70
End: 2020-01-20
Payer: MEDICARE

## 2020-01-24 ENCOUNTER — HOSPITAL ENCOUNTER (OUTPATIENT)
Dept: PHYSICAL THERAPY | Age: 70
Discharge: HOME | End: 2020-01-24
Payer: MEDICARE

## 2020-01-24 PROCEDURE — 97140 MANUAL THERAPY 1/> REGIONS: CPT

## 2020-01-24 PROCEDURE — 97110 THERAPEUTIC EXERCISES: CPT

## 2020-01-24 NOTE — PROGRESS NOTES
PHYSICAL THERAPY - DAILY TREATMENT NOTE    Patient Name: Vincenzo Gr        Date: 2020  : 1950   yes Patient  Verified  Visit #:     Insurance: Payor: Jordon Campuzano / Plan: [address] [place] [address] HMO / Product Type: Managed Care Medicare /      In time: 130 Out time: 245   Total Treatment Time: 75     Medicare/BCBS Time Tracking (below)   Total Timed Codes (min):  65 1:1 Treatment Time:  40     TREATMENT AREA =  Low back pain [M54.5]  Neck pain [M54.2]    SUBJECTIVE  Pain Level (on 0 to 10 scale):  2  / 10   Medication Changes/New allergies or changes in medical history, any new surgeries or procedures?    no  If yes, update Summary List   Subjective Functional Status/Changes:  []  No changes reported     Pt reporting recent max pain of 2/10. Reporting 80% overall improvement since beginning PT.         OBJECTIVE  Modalities Rationale:     decrease pain and increase tissue extensibility to improve patient's ability to perform pain free ADLs. min [] Estim, type/location:                                      []  att     []  unatt     []  w/US     []  w/ice    []  w/heat    min []  Mechanical Traction: type/lbs                   []  pro   []  sup   []  int   []  cont    []  before manual    []  after manual    min []  Ultrasound, settings/location:      min []  Iontophoresis w/ dexamethasone, location:                                               []  take home patch       []  in clinic   10 min []  Ice     [x]  Heat    location/position: Supine, lumbar. min []  Vasopneumatic Device, press/temp:     min []  Other:    [x] Skin assessment post-treatment (if applicable):    [x]  intact    []  redness- no adverse reaction     []redness  adverse reaction:        40 (15) min Therapeutic Exercise:  [x]  See flow sheet   Rationale:      increase ROM, increase strength and improve coordination to improve the patients ability to perform pain free ADLs.       25 Min Manual Therapy: STM/DTM (B) UT/LS, SOR. Rationale:      decrease pain, increase ROM, increase tissue extensibility and decrease trigger points to improve patient's ability to perform pain free ADLs. Billed With/As:   [x] TE   [] TA   [] Neuro   [] Self Care Patient Education: [x] Review HEP    [] Progressed/Changed HEP based on:   [] positioning   [] body mechanics   [] transfers   [] heat/ice application    [] other:      Other Objective/Functional Measures:    Pt able to perform bridge through 75% ROM. L/S AROM:   Flex to shins  Extension reduced 50%   (B) rot reduced 25%   (B) SB to knee    FOTO = 65   GROC = +6      Post Treatment Pain Level (on 0 to 10) scale:   0  / 10     ASSESSMENT  Assessment/Changes in Function:     See DC      []  See Progress Note/Recertification   Patient will continue to benefit from skilled PT services to    Progress toward goals / Updated goals:    See DC      PLAN  []  Upgrade activities as tolerated no Continue plan of care   [x]  Discharge due to : Program complete. []  Other:      Therapist: Tray Mendenhall, PTA    Date: 1/24/2020 Time: 2:15 PM     No future appointments.

## 2020-01-27 NOTE — PROGRESS NOTES
[facility] PHYSICAL THERAPY  [address] [place] [address] [address],[place], [address] [facility] - Phone: [phone]  Fax: [phone]          Patient Name: Robbie Henley : 1950   Treatment/Medical Diagnosis: Low back pain [M54.5]  Neck pain [M54.2]   Onset Date: 10/28/2019    Referral Source: Killian Castro [facility]): 2019   Prior Hospitalization: See Medical History Provider #: [id number]   Prior Level of Function: Unrestricted and pain free reaching and sitting. Comorbidities: BMI >30, HTN, TIA ()   Medications: Verified on Patient Summary List   Visits from [facility]: 15 Missed Visits: 3     Goal/Measure of Progress Goal Met? 1. Patient independent with HEP. Status at last Eval: Semi compliant Current Status: Reports compliance with HS stretches progressing   2. Patient will increase L/S ROM  to WNLs to increase ability to perform household chores. Status at last Eval: Flex to shins  Ext 50%   (B) rot 50%   Lsb fib head  Rsb mid thigh Current Status: Flex to shins  Ext 50%   (B) rot 75%   (B) SB to knee progressing   3. Increase FOTO to 70/100 indicating improved function and quality of life. Status at last Eval: 63 Current Status: 65 progressing     4. Patient to perform >/=65% bridge indicating improved core strength to improve ambulation around the grocery store. Status at last Eval: Reduced 50%  Current Status: Reduced 25%  yes     Key Functional Changes/Progress: Pt presented to [facility] w/ c/o low back pain. Pt currently reporting 2/10 recent max pain, 80% overall improvement since beginning PT. Pt reports compliance with HS stretches at home. Pt able to perform bridge through 75% ROM. Improvement in lumbar AROM noted, see above. Current FOTO = 65, GROC = +6. Pt prepared for DC at this time. Assessments/Recommendations: Discontinue therapy.  Progressing towards or have reached established goals. If you have any questions/comments please contact us directly at [phone]. Thank you for allowing us to assist in the care of your patient.     Therapist Signature: PACO Corbett DPT Date: 8/07/6285   Certification Period:   Reporting Period: 11/13/2019 - 2/11/2020 12/13/2019 - 1/24/2020 Time: 10:19 AM